# Patient Record
Sex: MALE | Race: WHITE | NOT HISPANIC OR LATINO | Employment: STUDENT | URBAN - METROPOLITAN AREA
[De-identification: names, ages, dates, MRNs, and addresses within clinical notes are randomized per-mention and may not be internally consistent; named-entity substitution may affect disease eponyms.]

---
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
## 2024-09-10 ENCOUNTER — TELEPHONE (OUTPATIENT)
Age: 19
End: 2024-09-10

## 2024-09-10 NOTE — TELEPHONE ENCOUNTER
I spoke with pt. I will schedule him tomorrow for Thursday 9/12 @ 2:15 in the same day slot. I must wait until tomorrow to use the appt slot.

## 2024-09-10 NOTE — TELEPHONE ENCOUNTER
Caller: Levi Mcginnis    Doctor: NP/Dr. Maria/[place] office    Reason for call: called back to say he can't make appt time/date/attached to last message about  This so had to do this to close the telephone message.  Call back#: NA

## 2024-09-10 NOTE — TELEPHONE ENCOUNTER
I received a Care Request from patient to schedule for:  First Name: Levi  Last Name: Ras  Email: [email or web address]  Phone: [phone]  YOB: 2005  Zip code: [address]  Where does it hurt? Broken big toe needs to be looked at by an orthopedic doctor        I lvm to [facility] at [phone].

## 2024-09-10 NOTE — TELEPHONE ENCOUNTER
I left a detailed message for the pt with appt time, date and location. The number provided with the telephone message was incorrect I looked in the chart for correct number.

## 2024-09-10 NOTE — TELEPHONE ENCOUNTER
Caller: Levi Mcginnis    Doctor and/or Office: Dr. Maria/[place]/NP    #: [phone]    [place]: Appointment/Levi called back and cannot make this appt. Time/date. Please call back and RS when he is able to come. Thanks

## 2024-09-10 NOTE — TELEPHONE ENCOUNTER
Hello,    Please advise if a forced appointment can be accommodated for the patient:    Call back #: [phone]     Insurance: [employer]     Reason for appointment: Fractured Right great toe in 3 places. [facility] he stated he does have the disc. He is a student at [employer] and said if he does not answer it is ok to leave a detailed message.     Requested doctor and/or location: [facility]       Thank you.

## 2024-09-12 ENCOUNTER — OFFICE VISIT (OUTPATIENT)
Dept: PODIATRY | Facility: CLINIC | Age: 19
End: 2024-09-12
Payer: COMMERCIAL

## 2024-09-12 DIAGNOSIS — S92.414A CLOSED NONDISPLACED FRACTURE OF PROXIMAL PHALANX OF RIGHT GREAT TOE, INITIAL ENCOUNTER: Primary | ICD-10-CM

## 2024-09-12 PROCEDURE — 99203 OFFICE O/P NEW LOW 30 MIN: CPT | Performed by: PODIATRIST

## 2024-09-12 NOTE — PROGRESS NOTES
Ambulatory Visit  Name: Levi Mcginnis      : 2005      MRN: [id number]  Encounter Provider: Simeon Maria DPM  Encounter Date: 2024   Encounter department: [facility]    Assessment & Plan  Closed nondisplaced fracture of proximal phalanx of right great toe, initial encounter  Diagnosis and options discussed with patient  Patient agreeable to the plan as stated below  Surgical shoe 4 weeks, serial XR if needed  Reduce activity  XR reviewed with patient.         History of Present Illness     Levi Mcginnis is a 19 y.o. male who presents with toe injury.  On  he excellently kicked a door and felt immediate toe pain.  The next day got an x-ray and was told he broke his great toe.  He was put in a boot and referred here.  Patient is otherwise healthy.      Review of Systems  As stated in HPI, otherwise normal    Medical History Reviewed by provider this encounter:  Tobacco  Allergies  Meds  Problems  Med Hx  Surg Hx  Fam Hx            Objective     There were no vitals taken for this visit.    Physical Exam  Vitals reviewed.   Constitutional:       General: He is not in acute distress.  Cardiovascular:      Rate and Rhythm: Normal rate.      Pulses: Normal pulses.   Pulmonary:      Effort: Pulmonary effort is normal. No respiratory distress.   Musculoskeletal:         General: Tenderness (Tenderness across the great toe proximal phalanx.  No joint instability.  Expected postinjury bruising.) and signs of injury present.   Skin:     Capillary Refill: Capillary refill takes less than 2 seconds.      Findings: Bruising present.   Neurological:      Mental Status: He is alert and oriented to person, place, and time.      Sensory: No sensory deficit.      Gait: Gait normal.     XRay 3 views of the right foot personally read by Dr. Maria in office today and discussed with patient:    Comminuted fracture right great toe. Nondisplaced.   No  significant degenerative changes.  No lytic or blastic osseous lesion.  Soft tissues are unremarkable.